# Patient Record
Sex: FEMALE | Race: WHITE
[De-identification: names, ages, dates, MRNs, and addresses within clinical notes are randomized per-mention and may not be internally consistent; named-entity substitution may affect disease eponyms.]

---

## 2019-01-25 ENCOUNTER — RESULT REVIEW (OUTPATIENT)
Age: 44
End: 2019-01-25

## 2021-03-24 ENCOUNTER — RESULT REVIEW (OUTPATIENT)
Age: 46
End: 2021-03-24

## 2022-04-27 ENCOUNTER — RESULT REVIEW (OUTPATIENT)
Age: 47
End: 2022-04-27

## 2022-07-08 PROBLEM — Z00.00 ENCOUNTER FOR PREVENTIVE HEALTH EXAMINATION: Status: ACTIVE | Noted: 2022-07-08

## 2022-07-12 ENCOUNTER — APPOINTMENT (OUTPATIENT)
Dept: SURGERY | Facility: CLINIC | Age: 47
End: 2022-07-12

## 2022-07-12 VITALS
WEIGHT: 174 LBS | HEIGHT: 64 IN | TEMPERATURE: 97.6 F | SYSTOLIC BLOOD PRESSURE: 115 MMHG | HEART RATE: 58 BPM | DIASTOLIC BLOOD PRESSURE: 76 MMHG | BODY MASS INDEX: 29.71 KG/M2

## 2022-07-12 DIAGNOSIS — K40.91 UNILATERAL INGUINAL HERNIA, W/OUT OBSTRUCTION OR GANGRENE, RECURRENT: ICD-10-CM

## 2022-07-12 PROCEDURE — 99203 OFFICE O/P NEW LOW 30 MIN: CPT

## 2022-07-12 NOTE — HISTORY OF PRESENT ILLNESS
[de-identified] : The patient is self referred by for evaluation and discussion regarding a recurrent right inguinal hernia. She had a TEP repair 5 years ago. She feels a new lump there recently and it is uncomfortable with activities, especiall;y when lifitng while driving. She had a lump that was reduced. . \par

## 2022-07-12 NOTE — PHYSICAL EXAM
[Normal Breath Sounds] : Normal breath sounds [Normal Heart Sounds] : normal heart sounds [No Rash or Lesion] : No rash or lesion [Alert] : alert [Oriented to Person] : oriented to person [Oriented to Place] : oriented to place [Oriented to Time] : oriented to time [Calm] : calm [de-identified] : NAD [de-identified] : right inguinal hernia. slightly tender, reducible,

## 2022-07-12 NOTE — ASSESSMENT
[FreeTextEntry1] : Recurrent RIH, options discussed, TESFAYE repair  recommended\par (robotic assisted laparoscopic RIHR with mesh, possible left, possible open)\par The risks benefits and alternatives were discussed and the patient agrees to the described plan.\par \par

## 2022-07-25 ENCOUNTER — RESULT REVIEW (OUTPATIENT)
Age: 47
End: 2022-07-25

## 2022-07-25 ENCOUNTER — APPOINTMENT (OUTPATIENT)
Dept: SURGERY | Facility: HOSPITAL | Age: 47
End: 2022-07-25

## 2022-07-26 ENCOUNTER — NON-APPOINTMENT (OUTPATIENT)
Age: 47
End: 2022-07-26

## 2022-07-26 ENCOUNTER — TRANSCRIPTION ENCOUNTER (OUTPATIENT)
Age: 47
End: 2022-07-26

## 2022-08-08 PROBLEM — Z09 POSTOP CHECK: Status: ACTIVE | Noted: 2022-08-08

## 2022-08-09 ENCOUNTER — APPOINTMENT (OUTPATIENT)
Dept: SURGERY | Facility: CLINIC | Age: 47
End: 2022-08-09

## 2022-08-09 VITALS
WEIGHT: 170 LBS | SYSTOLIC BLOOD PRESSURE: 112 MMHG | DIASTOLIC BLOOD PRESSURE: 76 MMHG | HEART RATE: 58 BPM | BODY MASS INDEX: 29.02 KG/M2 | TEMPERATURE: 98.1 F | HEIGHT: 64 IN

## 2022-08-09 DIAGNOSIS — Z09 ENCOUNTER FOR FOLLOW-UP EXAMINATION AFTER COMPLETED TREATMENT FOR CONDITIONS OTHER THAN MALIGNANT NEOPLASM: ICD-10-CM

## 2022-08-09 PROCEDURE — 99024 POSTOP FOLLOW-UP VISIT: CPT

## 2022-08-09 NOTE — ASSESSMENT
[FreeTextEntry1] : Patient here for post op check. Doing  well Wounds clean and dry. Area of numbness on right outer thigh. No muscle weakness. Symptoms c/w postop changes from redo and mesh removal on the right. Reassured. Tolerating po and no GI complaints. To follow up PRN.\par

## 2023-04-17 ENCOUNTER — APPOINTMENT (OUTPATIENT)
Dept: NEUROLOGY | Facility: CLINIC | Age: 48
End: 2023-04-17
Payer: COMMERCIAL

## 2023-04-17 VITALS
DIASTOLIC BLOOD PRESSURE: 77 MMHG | OXYGEN SATURATION: 97 % | SYSTOLIC BLOOD PRESSURE: 115 MMHG | WEIGHT: 172 LBS | HEART RATE: 63 BPM | BODY MASS INDEX: 29.37 KG/M2 | HEIGHT: 64 IN

## 2023-04-17 DIAGNOSIS — R29.90 UNSPECIFIED SYMPTOMS AND SIGNS INVOLVING THE NERVOUS SYSTEM: ICD-10-CM

## 2023-04-17 PROCEDURE — 99204 OFFICE O/P NEW MOD 45 MIN: CPT

## 2023-04-19 NOTE — HISTORY OF PRESENT ILLNESS
[FreeTextEntry1] : Pt is 48 yo RH F with hx of migraine here for stroke like episode\par \par Pt seen in the office.\par \par Pt reports two episodes of visual disturbance in the recent past.  First episode was in jan 2023: At the time, pt was working () when her vision suddenly became blurry. Pt unclear if it was one or both eyes, but remembers that most of her visual field because blurry/difficult to read. When she looked at the blackboard, she felt that she could only read one work in the the center of her field.  otherwise, no HA, no weakness, no slurred speech.  Symptoms lasted for about 30min and resolved.\par \par Second episode occurred few weeks later, prior to going to work. This time, pt rested from work, stayed home, and slept for about 30min with resolution of symptoms). Pt denies any inc stress/poor sleep around the time.\par \par Pt with hx of migraine, very sporadic, usu resolved with excedrin and rarely takes maxalt for relief.  Denies any auras with her migraines. \par  \par \par Since then, pt had eval by optho with no sig finding. then subsequently referred to neurology for further eval.   \par Pt had outside MRI head, MRA head done with no sig finding.  Today, pt is AAox3. no HA, no blurry vision, no nausea. denies any additional symptoms since Jan.\par \par Sic Hx: no smoking, occ etoh\par fam hx: no early onset stroke
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
## 2023-04-19 NOTE — DATA REVIEWED
[de-identified] : MRI brain without contrast, 3/27/2023\par \par TECHNIQUE: 1.5 Marissa magnet. Multiplanar, multisequence imaging without\par contrast.\par \par CLINICAL INFORMATION: Unspecified visual loss, H54.7\par \par IMAGING FINDINGS: There is a solitary focus of high signal in the right\par frontal subcortical white matter (FLAIR image 36 series 503). Solitary focus\par of high signal is not considered a significant finding. It likely represents a\par focus of gliosis from old insult, type not determined. It may be many years\par old. There is no evidence of recent infarction with high signal on diffusion\par imaging. There is no evidence of recent or old hemorrhage with low signal on\par susceptibility weighted imaging. Normal vascular flow voids are demonstrated;\par there is no large vessel arterial occlusion.\par \par There is no hydrocephalus.\par \par There is no extra-axial collection.\par \par There is mild right maxillary sinus mucosal thickening with small retention\par cysts. There is mild ethmoid sinus mucosal thickening. There is marked nasal\par septum deviation to the right side.\par \par Mastoid air cells are unremarkable.\par \par Although this is not a dedicated orbit study, there is no evidence of\par intraconal or extraconal mass in the orbits. Optic nerves and optic chiasm\par appear within limits of normal. No abnormality of the globes is demonstrated.\par Extraocular muscles are unremarkable. The bony orbital walls are intact.\par \par No neoplastic replacement of bone marrow is demonstrated.\par \par \par IMPRESSION: Solitary focus of high signal in the right frontal subcortical\par white matter is consistent with a focus of gliosis from old insult, type not\par determined. Finding may be many years old.\par \par Limited view of the orbits demonstrates no abnormality. No abnormality of\par optic nerves or optic radiations is demonstrated. There is no compression of\par optic nerves or optic chiasm.\par \par There are no findings that fit Rico criteria for diagnosis of multiple\par sclerosis.\par \par MRA neck \par MR angiography of the intracranial circulation,  3/27/2023\par \par Technique: 1.5 Marissa magnet. MR angiography of the intracranial circulation\par was performed utilizing 3 dimensional time of flight technique. Multiple\par maximum intensity projections and source images were reviewed.\par \par Comparison: None\par \par Clinical Information: Unspecified visual loss, H54.7.\par \par Findings:  The visualized distal portions of the internal carotid arteries\par appear within limits of normal.  \par \par The most distal portions of the vertebral arteries are visualized. They appear\par within limits of normal.  The basilar artery is unremarkable.\par \par The visualized proximal portions of the anterior and posterior cerebral\par arteries appear within limits of normal. Left middle cerebral artery is\par unremarkable. There is small focus of signal dropout in the M1 segment of the\par right middle cerebral artery is felt to be related to flow related artifact\par from prominent branch extending inferiorly at that level. There is no evidence\par of aneurysm or arteriovenous malformation.\par \par Impression: Normal MR angiography of the proximal intracranial circulation.\par \par If there is a clinical need to evaluate the carotid arteries in the neck (such\par as if there is amaurosis fugax), then I recommend follow-up MR angiography of\par the neck.

## 2023-05-22 ENCOUNTER — APPOINTMENT (OUTPATIENT)
Dept: RHEUMATOLOGY | Facility: CLINIC | Age: 48
End: 2023-05-22
Payer: COMMERCIAL

## 2023-05-22 VITALS
HEART RATE: 65 BPM | HEIGHT: 64 IN | OXYGEN SATURATION: 98 % | DIASTOLIC BLOOD PRESSURE: 80 MMHG | BODY MASS INDEX: 29.02 KG/M2 | WEIGHT: 170 LBS | SYSTOLIC BLOOD PRESSURE: 122 MMHG

## 2023-05-22 DIAGNOSIS — H53.8 OTHER VISUAL DISTURBANCES: ICD-10-CM

## 2023-05-22 DIAGNOSIS — Z80.6 FAMILY HISTORY OF LEUKEMIA: ICD-10-CM

## 2023-05-22 DIAGNOSIS — R76.8 OTHER SPECIFIED ABNORMAL IMMUNOLOGICAL FINDINGS IN SERUM: ICD-10-CM

## 2023-05-22 DIAGNOSIS — G43.909 MIGRAINE, UNSPECIFIED, NOT INTRACTABLE, W/OUT STATUS MIGRAINOSUS: ICD-10-CM

## 2023-05-22 PROCEDURE — 99204 OFFICE O/P NEW MOD 45 MIN: CPT

## 2023-05-22 RX ORDER — ESCITALOPRAM OXALATE 20 MG/1
20 TABLET, FILM COATED ORAL
Refills: 0 | Status: ACTIVE | COMMUNITY

## 2023-05-22 RX ORDER — RIZATRIPTAN BENZOATE 10 MG/1
10 TABLET ORAL
Refills: 0 | Status: ACTIVE | COMMUNITY

## 2023-05-22 RX ORDER — ERENUMAB-AOOE 70 MG/ML
INJECTION SUBCUTANEOUS
Refills: 0 | Status: ACTIVE | COMMUNITY

## 2023-05-22 NOTE — HISTORY OF PRESENT ILLNESS
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

## 2023-05-22 NOTE — PHYSICAL EXAM
[General Appearance - Alert] : alert [General Appearance - In No Acute Distress] : in no acute distress [Sclera] : the sclera and conjunctiva were normal [Outer Ear] : the ears and nose were normal in appearance [Neck Appearance] : the appearance of the neck was normal [Auscultation Breath Sounds / Voice Sounds] : lungs were clear to auscultation bilaterally [Heart Rate And Rhythm] : heart rate was normal and rhythm regular [Heart Sounds] : normal S1 and S2 [Abnormal Walk] : normal gait [Nail Clubbing] : no clubbing  or cyanosis of the fingernails [Musculoskeletal - Swelling] : no joint swelling seen [Motor Tone] : muscle strength and tone were normal [] : no rash [No Focal Deficits] : no focal deficits [Oriented To Time, Place, And Person] : oriented to person, place, and time

## 2023-05-23 LAB
ALBUMIN SERPL ELPH-MCNC: 4.5 G/DL
ALP BLD-CCNC: 54 U/L
ALT SERPL-CCNC: 33 U/L
ANION GAP SERPL CALC-SCNC: 14 MMOL/L
APPEARANCE: CLEAR
AST SERPL-CCNC: 34 U/L
BACTERIA: NEGATIVE /HPF
BILIRUB SERPL-MCNC: 0.3 MG/DL
BILIRUBIN URINE: NEGATIVE
BLOOD URINE: NEGATIVE
BUN SERPL-MCNC: 19 MG/DL
C3 SERPL-MCNC: 118 MG/DL
C4 SERPL-MCNC: 18 MG/DL
CALCIUM SERPL-MCNC: 9.9 MG/DL
CAST: 0 /LPF
CHLORIDE SERPL-SCNC: 102 MMOL/L
CO2 SERPL-SCNC: 23 MMOL/L
COLOR: YELLOW
CREAT SERPL-MCNC: 0.98 MG/DL
CREAT SPEC-SCNC: 152 MG/DL
CREAT/PROT UR: 0.1 RATIO
EGFR: 72 ML/MIN/1.73M2
EPITHELIAL CELLS: 9 /HPF
GLUCOSE QUALITATIVE U: NEGATIVE MG/DL
GLUCOSE SERPL-MCNC: 96 MG/DL
KETONES URINE: 15 MG/DL
LEUKOCYTE ESTERASE URINE: NEGATIVE
MICROSCOPIC-UA: NORMAL
NITRITE URINE: NEGATIVE
PH URINE: 6
POTASSIUM SERPL-SCNC: 4.4 MMOL/L
PROT SERPL-MCNC: 7 G/DL
PROT UR-MCNC: 8 MG/DL
PROTEIN URINE: NEGATIVE MG/DL
RED BLOOD CELLS URINE: 2 /HPF
SODIUM SERPL-SCNC: 139 MMOL/L
SPECIFIC GRAVITY URINE: 1.02
UROBILINOGEN URINE: 0.2 MG/DL
WHITE BLOOD CELLS URINE: 1 /HPF

## 2024-05-28 ENCOUNTER — NON-APPOINTMENT (OUTPATIENT)
Age: 49
End: 2024-05-28

## 2025-02-11 ENCOUNTER — NON-APPOINTMENT (OUTPATIENT)
Age: 50
End: 2025-02-11

## 2025-02-11 DIAGNOSIS — N60.12 DIFFUSE CYSTIC MASTOPATHY OF LEFT BREAST: ICD-10-CM

## 2025-02-18 ENCOUNTER — NON-APPOINTMENT (OUTPATIENT)
Age: 50
End: 2025-02-18

## 2025-02-19 ENCOUNTER — NON-APPOINTMENT (OUTPATIENT)
Age: 50
End: 2025-02-19

## 2025-02-19 ENCOUNTER — APPOINTMENT (OUTPATIENT)
Dept: BREAST CENTER | Facility: CLINIC | Age: 50
End: 2025-02-19
Payer: COMMERCIAL

## 2025-02-19 VITALS
HEIGHT: 64 IN | HEART RATE: 73 BPM | WEIGHT: 155 LBS | DIASTOLIC BLOOD PRESSURE: 76 MMHG | BODY MASS INDEX: 26.46 KG/M2 | SYSTOLIC BLOOD PRESSURE: 125 MMHG

## 2025-02-19 DIAGNOSIS — Z86.59 PERSONAL HISTORY OF OTHER MENTAL AND BEHAVIORAL DISORDERS: ICD-10-CM

## 2025-02-19 DIAGNOSIS — Z78.9 OTHER SPECIFIED HEALTH STATUS: ICD-10-CM

## 2025-02-19 DIAGNOSIS — R92.2 INCONCLUSIVE MAMMOGRAM: ICD-10-CM

## 2025-02-19 DIAGNOSIS — Z86.2 PERSONAL HISTORY OF DISEASES OF THE BLOOD AND BLOOD-FORMING ORGANS AND CERTAIN DISORDERS INVOLVING THE IMMUNE MECHANISM: ICD-10-CM

## 2025-02-19 DIAGNOSIS — Z12.31 ENCOUNTER FOR SCREENING MAMMOGRAM FOR MALIGNANT NEOPLASM OF BREAST: ICD-10-CM

## 2025-02-19 DIAGNOSIS — R92.343 MAMMOGRAPHIC EXTREME DENSITY, BILATERAL BREASTS: ICD-10-CM

## 2025-02-19 DIAGNOSIS — Z80.6 FAMILY HISTORY OF LEUKEMIA: ICD-10-CM

## 2025-02-19 DIAGNOSIS — N60.01 SOLITARY CYST OF RIGHT BREAST: ICD-10-CM

## 2025-02-19 PROCEDURE — 99204 OFFICE O/P NEW MOD 45 MIN: CPT

## 2025-02-19 RX ORDER — LEVONORGESTREL 52 MG/1
INTRAUTERINE DEVICE INTRAUTERINE
Refills: 0 | Status: ACTIVE | COMMUNITY

## 2025-02-19 RX ORDER — GALCANEZUMAB 120 MG/ML
INJECTION, SOLUTION SUBCUTANEOUS
Refills: 0 | Status: ACTIVE | COMMUNITY

## 2025-02-19 RX ORDER — BUPROPION HYDROCHLORIDE 100 MG/1
TABLET, FILM COATED ORAL
Refills: 0 | Status: ACTIVE | COMMUNITY

## 2025-02-19 RX ORDER — QUETIAPINE FUMARATE 400 MG/1
TABLET ORAL
Refills: 0 | Status: ACTIVE | COMMUNITY

## 2025-06-09 ENCOUNTER — RESULT REVIEW (OUTPATIENT)
Age: 50
End: 2025-06-09